# Patient Record
(demographics unavailable — no encounter records)

---

## 2024-12-10 NOTE — HISTORY OF PRESENT ILLNESS
[de-identified] :  SARAY RINCON is a 48 year old patient seen initially for hearing evaluation. 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 he wears hearing protection. He reports failing a hearing test screening on 11/5/24. He was recommended to see ENT. Hearing worse in the right ear for about 1 year. Denies history of childhood infections. History of noise exposure. Denies using Qtips.

## 2024-12-10 NOTE — ASSESSMENT
[FreeTextEntry1] :  the audiogram was ordered by me and interpreted by me today and the results are as follows- Normal hearing in his left ear.  Sloping asymmetric sensorineural hearing loss with decreased speech discrimination score in his right ear.  Normal tympanograms. He will be sent for an MRI to rule out a retrocochlear lesion. Of note he feels that the hearing changes greater than 1 year. -Findings were reviewed and discussed with the patient in detail -Good aural hygiene reviewed -Patient may use wax removal drops as needed -Avoid noise exposure

## 2024-12-10 NOTE — PHYSICAL EXAM
[TextEntry] :   PHYSICAL EXAM   General: The patient was alert and oriented and in no distress. Voice was clear.   Eyes: The patient was alert, oriented and in no distress. Voice was clear.   Eyes: Ocular motility normal. No proptosis. Conjunctiva normal. Sclera white. There was no significant nystagmus or disconjugate gaze noted.   Face: The patient had no facial asymmetry or mass. The skin was unremarkable.   Nose: The external nose had no significant deformity.  There was no facial tenderness.  On anterior rhinoscopy, the nasal mucosa was healthy in appearance. The anterior septum was midline.  There were no visualized polyps purulence  or masses.   Oral cavity: Oral mucosa- normal. Oral and base of tongue- clear and without mass. Gingival and buccal mucosa- moist and without lesions. Palate- the palate moved well. There was no cleft palate. There appeared to be good salivary flow. Oral cavity/oropharynx- no pus, erythema or mass.   Neck: The neck was symmetrical. The parotid and submandibular glands were normal without masses. The trachea was midline and there was no unusual crepitus. Thyroid was smooth and nontender and no masses were palpated. Cervical adenopathy- none.     PROCEDURE: Microscopic ear exam INDICATIONS:  Hearing loss. Cerumen impaction Left: Pinna normal, EAC and tympanic membrane within normal limits. No evidence of fluid in the middle ear space. Right: Pinna normal,  SIGNIFICANT CERUMEN IMPACTION REMOVED USING OTOLOGIC INSTRUMENTS (loop, suction and alligator), EAC and tympanic membrane within normal limits. No evidence of fluid in the middle ear space